# Patient Record
Sex: MALE | Race: ASIAN | NOT HISPANIC OR LATINO | Employment: STUDENT | URBAN - METROPOLITAN AREA
[De-identification: names, ages, dates, MRNs, and addresses within clinical notes are randomized per-mention and may not be internally consistent; named-entity substitution may affect disease eponyms.]

---
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
## 2019-10-17 ENCOUNTER — HOSPITAL ENCOUNTER (EMERGENCY)
Facility: HOSPITAL | Age: 19
Discharge: HOME | End: 2019-10-17
Attending: EMERGENCY MEDICINE | Admitting: EMERGENCY MEDICINE
Payer: COMMERCIAL

## 2019-10-17 VITALS
HEART RATE: 81 BPM | RESPIRATION RATE: 16 BRPM | WEIGHT: 155 LBS | TEMPERATURE: 97.1 F | BODY MASS INDEX: 22.96 KG/M2 | DIASTOLIC BLOOD PRESSURE: 95 MMHG | OXYGEN SATURATION: 96 % | SYSTOLIC BLOOD PRESSURE: 169 MMHG | HEIGHT: 69 IN

## 2019-10-17 DIAGNOSIS — R13.10 DYSPHAGIA, UNSPECIFIED TYPE: ICD-10-CM

## 2019-10-17 DIAGNOSIS — T78.40XA ALLERGIC REACTION, INITIAL ENCOUNTER: Primary | ICD-10-CM

## 2019-10-17 PROCEDURE — 99282 EMERGENCY DEPT VISIT SF MDM: CPT

## 2019-10-17 PROCEDURE — 99284 EMERGENCY DEPT VISIT MOD MDM: CPT | Performed by: EMERGENCY MEDICINE

## 2019-10-17 RX ORDER — PREDNISONE 20 MG/1
40 TABLET ORAL ONCE
Status: COMPLETED | OUTPATIENT
Start: 2019-10-17 | End: 2019-10-17

## 2019-10-17 RX ADMIN — PREDNISONE 40 MG: 20 TABLET ORAL at 18:25

## 2019-10-17 NOTE — ED ATTENDING ATTESTATION
10/17/2019  IGeoffrey MD, saw and evaluated the patient  I have discussed the patient with the resident/non-physician practitioner and agree with the resident's/non-physician practitioner's findings, Plan of Care, and MDM as documented in the resident's/non-physician practitioner's note, except where noted  All available labs and Radiology studies were reviewed  I was present for key portions of any procedure(s) performed by the resident/non-physician practitioner and I was immediately available to provide assistance  At this point I agree with the current assessment done in the Emergency Department  I have conducted an independent evaluation of this patient a history and physical is as follows:    ED Course         Critical Care Time  Procedures     21 yo male got flu shot and tdap booster today, but just had one few months ago  Pt after vaccines had trouble swallowing, lightheadedness which mostly resolved but still having trouble swallowing  No n/v/d  No hives, no rash  No meds at home  Vss, afebrile, lungs cta, rrr, abdomen soft nontender, no rash, no pharyngeal erythema      prednisone

## 2019-10-17 NOTE — ED PROVIDER NOTES
History  Chief Complaint   Patient presents with    Allergic Reaction     Pt states he had flu shot and tdap shot around 1630 and is now feeling as if he is having trouble swallowing  Pt denies trouble breathing/swollen tongue  Pt states he is unsure if he is just paranoid or if his throat is swollen  22 YOM with no PMH presents with possible allergic reaction  Pt states he received his influenza and Tdap vaccinations around 2:30pm this afternoon  He unknowingly received his Tdap booster in July as well  He called his PCP and was informed that receiving a second dose was not an issue  About 20-30 mins after receiving the vaccinations, however, he developed a sensation of difficulty swallowing  He also had a period of shortness of breath and lightheadedness, however, these have resolved  He remains feeling like he is having mild difficulty swallowing stating "things just don't feel like they are going down right " He denies any tongue or lip swelling, nausea, vomiting, diarrhea, abdominal pain, urticaria or rashes  He has received both of these vaccinations in the past without allergic reactions  He has not taken anything at home for his symptoms  None       History reviewed  No pertinent past medical history  History reviewed  No pertinent surgical history  History reviewed  No pertinent family history  I have reviewed and agree with the history as documented  Social History     Tobacco Use    Smoking status: Never Smoker    Smokeless tobacco: Never Used   Substance Use Topics    Alcohol use: Yes    Drug use: Not Currently        Review of Systems   Constitutional: Negative for chills, diaphoresis and fever  HENT: Positive for trouble swallowing  Negative for drooling, facial swelling, sore throat and voice change  Eyes: Negative for visual disturbance  Respiratory: Negative for cough, chest tightness, shortness of breath and wheezing      Cardiovascular: Negative for chest pain and leg swelling  Gastrointestinal: Negative for abdominal distention, abdominal pain, diarrhea, nausea and vomiting  Genitourinary: Negative for flank pain  Musculoskeletal: Negative for back pain and gait problem  Skin: Negative for pallor and rash  Neurological: Negative for dizziness, syncope, weakness, light-headedness and headaches  All other systems reviewed and are negative  Physical Exam  ED Triage Vitals [10/17/19 1747]   Temperature Pulse Respirations Blood Pressure SpO2   (!) 97 1 °F (36 2 °C) 81 16 169/95 96 %      Temp src Heart Rate Source Patient Position - Orthostatic VS BP Location FiO2 (%)   -- -- -- -- --      Pain Score       No Pain             Orthostatic Vital Signs  Vitals:    10/17/19 1747   BP: 169/95   Pulse: 81       Physical Exam   Constitutional: He is oriented to person, place, and time  No distress  HENT:   Head: Normocephalic and atraumatic  Moist mucous membranes  Slight erythema of the posterior pharynx  No swelling of the tongue or uvula noted  Eyes: Conjunctivae are normal    Neck: Normal range of motion  Neck supple  Cardiovascular: Normal rate and regular rhythm  Exam reveals no gallop and no friction rub  No murmur heard  Pulmonary/Chest: Effort normal and breath sounds normal  No respiratory distress  He has no wheezes  He has no rales  Abdominal: Soft  Bowel sounds are normal  He exhibits no distension  There is no tenderness  Musculoskeletal: He exhibits no edema  Neurological: He is alert and oriented to person, place, and time  Skin: Skin is warm and dry  No rash noted  Psychiatric: He has a normal mood and affect  His behavior is normal    Nursing note and vitals reviewed        ED Medications  Medications   predniSONE tablet 40 mg (40 mg Oral Given 10/17/19 1825)       Diagnostic Studies  Results Reviewed     None                 No orders to display         Procedures  Procedures        ED Course MDM  Number of Diagnoses or Management Options  Allergic reaction, initial encounter: new and does not require workup  Dysphagia, unspecified type: new and does not require workup  Diagnosis management comments: 22 YOM presenting for allergic reaction  No signs of anaphylaxis  Severe allergic reaction is unlikely at this point given administration over 4 hours ago  Will give 40mg of prednisone for symptomatic relief  Stable for discharge  Return precautions discussed  Amount and/or Complexity of Data Reviewed  Decide to obtain previous medical records or to obtain history from someone other than the patient: yes  Review and summarize past medical records: yes  Discuss the patient with other providers: yes    Risk of Complications, Morbidity, and/or Mortality  Presenting problems: low  Management options: low    Patient Progress  Patient progress: stable      Disposition  Final diagnoses: Allergic reaction, initial encounter   Dysphagia, unspecified type     Time reflects when diagnosis was documented in both MDM as applicable and the Disposition within this note     Time User Action Codes Description Comment    10/17/2019  6:23 PM Eual Monday Add [T78 [id number]] Allergic reaction, initial encounter     10/17/2019  6:23 PM Jonna Gomez [R13 [id number]] Dysphagia, unspecified type       ED Disposition     ED Disposition Condition Date/Time Comment    Discharge Stable Thu Oct 17, 2019  6:23 PM Griselda Gandhi discharge to home/self care  Follow-up Information     Follow up With Specialties Details Why Contact Info    [employer]  In 2 days  [phone]            Patient's Medications    No medications on file     No discharge procedures on file  ED Provider  Attending physically available and evaluated Griselda Gandhi I managed the patient along with the ED Attending      Electronically Signed by         Dany Chris MD  10/17/19 5311

## 2019-10-17 NOTE — DISCHARGE INSTRUCTIONS
You came to the emergency department for evaluation of an allergic reaction  We gave you a medication called prednisone to help with your symptoms  Your reaction is mild at this time and should likely improve over the next several hours  Follow up with your primary care doctor in 2 days for re-evaluation  Please return to the emergency department if you develop difficulty breathing, a diffuse rash, wheezing, or anything else concerning to you